# Patient Record
Sex: MALE | Race: WHITE | NOT HISPANIC OR LATINO | URBAN - METROPOLITAN AREA
[De-identification: names, ages, dates, MRNs, and addresses within clinical notes are randomized per-mention and may not be internally consistent; named-entity substitution may affect disease eponyms.]

---

## 2024-01-30 ENCOUNTER — CLINICAL SUPPORT (OUTPATIENT)
Dept: AUDIOLOGY | Facility: CLINIC | Age: 49
End: 2024-01-30
Payer: COMMERCIAL

## 2024-01-30 ENCOUNTER — OFFICE VISIT (OUTPATIENT)
Dept: OTOLARYNGOLOGY | Facility: CLINIC | Age: 49
End: 2024-01-30
Payer: COMMERCIAL

## 2024-01-30 VITALS — HEIGHT: 71 IN | WEIGHT: 265 LBS | BODY MASS INDEX: 37.1 KG/M2

## 2024-01-30 DIAGNOSIS — H90.3 BILATERAL SENSORINEURAL HEARING LOSS: ICD-10-CM

## 2024-01-30 DIAGNOSIS — H90.3 SENSORINEURAL HEARING LOSS (SNHL) OF BOTH EARS: ICD-10-CM

## 2024-01-30 DIAGNOSIS — H90.3 SENSORINEURAL HEARING LOSS (SNHL) OF BOTH EARS: Primary | ICD-10-CM

## 2024-01-30 DIAGNOSIS — H90.3 ASNHL (ASYMMETRICAL SENSORINEURAL HEARING LOSS): ICD-10-CM

## 2024-01-30 DIAGNOSIS — H91.93 BILATERAL HEARING LOSS, UNSPECIFIED HEARING LOSS TYPE: Primary | ICD-10-CM

## 2024-01-30 PROCEDURE — 92557 COMPREHENSIVE HEARING TEST: CPT | Performed by: AUDIOLOGIST

## 2024-01-30 PROCEDURE — 99203 OFFICE O/P NEW LOW 30 MIN: CPT | Performed by: OTOLARYNGOLOGY

## 2024-01-30 PROCEDURE — 92567 TYMPANOMETRY: CPT | Performed by: AUDIOLOGIST

## 2024-01-30 NOTE — PROGRESS NOTES
AUDIOLOGIC EVALUATION    Name:  Shaheen Miller  :  1975  Age:  48 y.o.    HISTORY:     Patient reported to have trouble distinguishing words with people with accidents.Patient reported to have gradual hearing loss over the past few years.  He reported to notice he has to turn the TV up louder than he used to before.  He also reported a history of ear infections as a child.  Patient denied any ear pain, pressure, surgery, family history of hearing loss, drainage, and tinnitus.    EVALUATION:    See Audiogram.    RESULTS:    Otoscopy:  Right: Clear canal with minimal non-occluding cerumen, normal color and appearance of tympanic membrane.  Left: Clear canal with minimal non-occluding cerumen, normal color and appearance of tympanic membrane.    Tympanometry:  Right: Normal tympanic membrane mobility and middle ear pressure.  Left: Normal tympanic membrane mobility and middle ear pressure.    Hearing Sensitivity:  Right: Essentially mild to moderate sensorineural hearing loss across the frequency range.  Left: Essentially mild 250 through 6000 Hz sensorineural hearing loss rising to within normal limits at 8000 Hz.    Word Recognition Score (NU-6 by difficulty list 1 and 2):  Right: Excellent (100%) at 75 dB.  Left: Excellent (100%) at 75 dB.      ASSESSMENT AND PLAN:    - Continue medical follow-up with Dr. Portillo.  - Consider hearing needs assessment to discuss hearing loss.  - Monitor and recheck as warranted.  - Counseled regarding results and recommendations.      LISA Chester./B.S.  Audiology Student Extern    Nic Zendejas  Clinical Audiologist

## 2024-01-30 NOTE — PROGRESS NOTES
Subjective   Patient ID: Shaheen Miller is a 48 y.o. male  HPI  Patient is complaining of a chronic history of hearing loss.  He has no otalgia and no otorrhea and no vertigo and he has not noticed any sudden change in his hearing.  He has no tinnitus.  Review of Systems   HENT:  Positive for hearing loss.        Objective   Physical Exam  The following elements of a brief ear nose and throat exam were performed: External ear canals and tympanic membranes, external nose and nasal passages, oral cavity, palpation of the neck, percussion of the face, palpation of the thyroid.    The ear canals and the tympanic membranes are clear and mobile.  The remainder of his exam was within normal limits.  An audiogram and tympanogram were ordered obtained and results were reviewed today and revealed bilateral mild to moderate sensorineural hearing loss.  There is asymmetry on the right side into frequencies only in the higher frequencies.    Assessment/Plan   Shaheen was seen today for hearing loss.  Diagnoses and all orders for this visit:  Sensorineural hearing loss (SNHL) of both ears (Primary)  -     Tympanometry Only; Future  -     Comprehensive hearing test; Future  ASNHL (asymmetrical sensorineural hearing loss)     Mild to moderate bilateral sensorineural hearing loss with asymmetry on the right side and 2 high frequencies only.  The patient was medically cleared for the use of hearing aids and he was advised to follow-up in 6 months for a repeat hearing test.  If there is any change in the hearing then MRI of the brain and IACs will be considered.  The patient agrees and he does not want an MRI at this point.

## 2024-03-07 ENCOUNTER — CLINICAL SUPPORT (OUTPATIENT)
Dept: AUDIOLOGY | Facility: CLINIC | Age: 49
End: 2024-03-07
Payer: COMMERCIAL

## 2024-03-07 DIAGNOSIS — H90.3 SENSORINEURAL HEARING LOSS (SNHL) OF BOTH EARS: Primary | ICD-10-CM

## 2024-03-07 NOTE — PROGRESS NOTES
HEARING NEEDS ASSESSMENT    Name:  Shaheen Miller  :  1975  Age:  48 y.o.    HISTORY:    Patient seen for a hearing needs assessment.     EXAM/PROCEDURES:    Reviewed patient's hearing loss.  Reviewed patient's insurance coverage.  Patient has coverage through his insurance.    Demonstrated both Oticon and Phonak technology with the patient in the office, during the appointment.  Patient preferred Phonak technology as it felt lighter in the ear and improved sound quality compared to Oticon.    Discussed patient's communication needs and concerns.  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 He also stated to struggle in social settings like restaurants, especially in the presence of background noise. Discussed patient's expectations and goals for use of hearing aids.      Discussed rechargeable considerations , Bluetooth technologies, and features of the hearing aids at different technology levels.  Reviewed pricing and TLC program.    Discussed with patient to contact the office if he would like to proceed with purchasing hearing aids.  If patient decides to go through with purchasing technology, Phonak Audeo L-R will be ordered in the color and technology level of his choice.    RECOMMENDATIONS AND FOLLOW-UP:    Discussed with patient to contact the office if he would like to proceed with ordering hearing aids.  Patient is to contact the office sooner if problems arise.    LISA Chester./B.S.  Audiology Student Extern    Nic Zendejas  Clinical Audiologist

## 2024-07-02 ENCOUNTER — APPOINTMENT (OUTPATIENT)
Dept: OTOLARYNGOLOGY | Facility: CLINIC | Age: 49
End: 2024-07-02
Payer: COMMERCIAL

## 2024-07-02 VITALS — WEIGHT: 265 LBS | BODY MASS INDEX: 37.1 KG/M2 | HEIGHT: 71 IN

## 2024-07-02 DIAGNOSIS — H90.3 SENSORINEURAL HEARING LOSS (SNHL) OF BOTH EARS: Primary | ICD-10-CM

## 2024-07-02 DIAGNOSIS — H90.3 ASNHL (ASYMMETRICAL SENSORINEURAL HEARING LOSS): ICD-10-CM

## 2024-07-02 PROCEDURE — 99212 OFFICE O/P EST SF 10 MIN: CPT | Performed by: OTOLARYNGOLOGY

## 2024-07-02 PROCEDURE — 1036F TOBACCO NON-USER: CPT | Performed by: OTOLARYNGOLOGY

## 2024-07-02 NOTE — PROGRESS NOTES
Subjective   Patient ID: Shaheen Miller is a 48 y.o. male  HPI  Patient presents for follow-up for bilateral sensorineural hearing loss with asymmetry on the right side.  He has been using hearing aids and he is happy with the hearing aids at this point.  He has no otalgia and no otorrhea and he has not noticed any change in his hearing.  Review of Systems    Objective   Physical Exam  The ear canals and the tympanic membranes are clear and mobile.    Assessment/Plan   Diagnoses and all orders for this visit:  Sensorineural hearing loss (SNHL) of both ears (Primary)  -     Comprehensive hearing test; Future  ASNHL (asymmetrical sensorineural hearing loss)  -     Comprehensive hearing test; Future     Bilateral sensorineural hearing loss with asymmetry on the right side.  The patient is currently satisfied with the hearing aids.  He had previously declined the MRI of the brain IACs understanding that a retrocochlear lesion could be missed.  A repeat hearing test was scheduled at this point and if there is no change in the hearing then he will follow-up in 1 year with a repeat hearing test.

## 2024-08-20 NOTE — PROGRESS NOTES
AUDIOLOGIC EVALUATION    Name:  Shaheen Miller  :    1975  Age:     48 y.o.    HISTORY:     Patient was seen initially for a hearing test on 2024 which revealed right ear hearing essentially mild to moderate sensorineural hearing loss across the frequency range. Left ear hearing essentially mild 250 through 6000 Hz sensorineural hearing loss rising to within normal limits at 8000 Hz. He was seen for hearing needs assessments with Dr. Izabela Iglesias on 2024 which determined he has hearing aid benefit through his insurance. Patient was fit at an outside facility with Phonak hearing aids and is satisfied with his devices. He is medically followed by Dr. Portillo, to monitor right ear asymmetry. He had previously declined the MRI of the brain IACs understanding that a retrocochlear lesion could be missed. A one year repeat hearing test and medical follow up was recommended if there is no change in the hearing.    Today patient denied change in his hearing, otalgia, ear pressure, dizziness, tinnitus, recent ear infections, and otorrhea.     EVALUATION:    Please see Audiogram.    RESULTS:    Otoscopy:  Right: Clear canal, normal color and appearance of tympanic membrane.  Left: Clear canal, normal color and appearance of tympanic membrane.    Tympanometry:  Right: Did not test.   Left: Did not test.    Hearing Sensitivity:  Right: Essentially mild to moderate sensorineural hearing loss across the frequency range.   Left: Essentially mild 250 through 6000 Hz sensorineural hearing loss rising to within normal limits at 8000 Hz.  Noted 20-25 dB right ear asymmetry at 6000 and 8000 Hz.     Word Recognition Score (NU-6 by difficulty):  Right: Excellent (100%) at 60 dBHL.  Left: Excellent (100%) at 65 dBHL.    IMPRESSIONS:    Compared to previous hearing evaluation on , hearing remains stable.     ASSESSMENT AND PLAN:    - Continue medical follow-up with Dr. Portillo.  - Monitor and recheck hearing  annually.  - Counseled regarding results and recommendations.  - Continue use of amplification and follow-up as recommended for hearing aid maintenance and adjustments.      Nic Dobson.  Clinical Audiologist

## 2024-08-22 ENCOUNTER — APPOINTMENT (OUTPATIENT)
Dept: AUDIOLOGY | Facility: CLINIC | Age: 49
End: 2024-08-22
Payer: COMMERCIAL

## 2024-08-27 ENCOUNTER — APPOINTMENT (OUTPATIENT)
Dept: AUDIOLOGY | Facility: CLINIC | Age: 49
End: 2024-08-27
Payer: COMMERCIAL

## 2024-08-27 DIAGNOSIS — H90.3 SENSORINEURAL HEARING LOSS (SNHL) OF BOTH EARS: ICD-10-CM

## 2024-08-27 DIAGNOSIS — H90.3 ASNHL (ASYMMETRICAL SENSORINEURAL HEARING LOSS): Primary | ICD-10-CM

## 2024-08-27 PROCEDURE — 92556 SPEECH AUDIOMETRY COMPLETE: CPT

## 2024-08-27 PROCEDURE — 92552 PURE TONE AUDIOMETRY AIR: CPT

## 2025-02-14 PROCEDURE — 88305 TISSUE EXAM BY PATHOLOGIST: CPT | Performed by: DERMATOLOGY

## 2025-02-17 ENCOUNTER — LAB REQUISITION (OUTPATIENT)
Dept: DERMATOPATHOLOGY | Facility: CLINIC | Age: 50
End: 2025-02-17
Payer: COMMERCIAL

## 2025-02-17 DIAGNOSIS — L98.9 DISORDER OF THE SKIN AND SUBCUTANEOUS TISSUE, UNSPECIFIED: ICD-10-CM

## 2025-02-18 LAB
LABORATORY COMMENT REPORT: NORMAL
PATH REPORT.FINAL DX SPEC: NORMAL
PATH REPORT.GROSS SPEC: NORMAL
PATH REPORT.MICROSCOPIC SPEC OTHER STN: NORMAL
PATH REPORT.RELEVANT HX SPEC: NORMAL
PATH REPORT.TOTAL CANCER: NORMAL

## 2025-09-09 ENCOUNTER — APPOINTMENT (OUTPATIENT)
Dept: AUDIOLOGY | Facility: CLINIC | Age: 50
End: 2025-09-09
Payer: COMMERCIAL

## 2025-09-09 ENCOUNTER — APPOINTMENT (OUTPATIENT)
Dept: OTOLARYNGOLOGY | Facility: CLINIC | Age: 50
End: 2025-09-09
Payer: COMMERCIAL